# Patient Record
Sex: FEMALE | Race: BLACK OR AFRICAN AMERICAN | NOT HISPANIC OR LATINO | URBAN - METROPOLITAN AREA
[De-identification: names, ages, dates, MRNs, and addresses within clinical notes are randomized per-mention and may not be internally consistent; named-entity substitution may affect disease eponyms.]

---

## 2017-08-20 ENCOUNTER — EMERGENCY (EMERGENCY)
Facility: HOSPITAL | Age: 52
LOS: 1 days | Discharge: HOME | End: 2017-08-20
Attending: EMERGENCY MEDICINE | Admitting: EMERGENCY MEDICINE
Payer: MEDICAID

## 2017-08-20 VITALS
DIASTOLIC BLOOD PRESSURE: 61 MMHG | TEMPERATURE: 98 F | SYSTOLIC BLOOD PRESSURE: 129 MMHG | OXYGEN SATURATION: 100 % | RESPIRATION RATE: 16 BRPM | HEART RATE: 98 BPM

## 2017-08-20 VITALS
SYSTOLIC BLOOD PRESSURE: 159 MMHG | HEART RATE: 90 BPM | DIASTOLIC BLOOD PRESSURE: 80 MMHG | OXYGEN SATURATION: 99 % | RESPIRATION RATE: 16 BRPM

## 2017-08-20 LAB
ALBUMIN SERPL ELPH-MCNC: 4.7 G/DL — SIGNIFICANT CHANGE UP (ref 3.3–5)
ALP SERPL-CCNC: 103 U/L — SIGNIFICANT CHANGE UP (ref 40–120)
ALT FLD-CCNC: 84 U/L — HIGH (ref 4–33)
AST SERPL-CCNC: 71 U/L — HIGH (ref 4–32)
BASOPHILS # BLD AUTO: 0.04 K/UL — SIGNIFICANT CHANGE UP (ref 0–0.2)
BASOPHILS NFR BLD AUTO: 0.7 % — SIGNIFICANT CHANGE UP (ref 0–2)
BILIRUB SERPL-MCNC: 2.4 MG/DL — HIGH (ref 0.2–1.2)
BUN SERPL-MCNC: 8 MG/DL — SIGNIFICANT CHANGE UP (ref 7–23)
CALCIUM SERPL-MCNC: 9.5 MG/DL — SIGNIFICANT CHANGE UP (ref 8.4–10.5)
CHLORIDE SERPL-SCNC: 100 MMOL/L — SIGNIFICANT CHANGE UP (ref 98–107)
CO2 SERPL-SCNC: 22 MMOL/L — SIGNIFICANT CHANGE UP (ref 22–31)
CREAT SERPL-MCNC: 0.81 MG/DL — SIGNIFICANT CHANGE UP (ref 0.5–1.3)
EOSINOPHIL # BLD AUTO: 0.13 K/UL — SIGNIFICANT CHANGE UP (ref 0–0.5)
EOSINOPHIL NFR BLD AUTO: 2.1 % — SIGNIFICANT CHANGE UP (ref 0–6)
GLUCOSE SERPL-MCNC: 166 MG/DL — HIGH (ref 70–99)
HCT VFR BLD CALC: 34.6 % — SIGNIFICANT CHANGE UP (ref 34.5–45)
HGB BLD-MCNC: 11.7 G/DL — SIGNIFICANT CHANGE UP (ref 11.5–15.5)
IMM GRANULOCYTES # BLD AUTO: 0.06 # — SIGNIFICANT CHANGE UP
IMM GRANULOCYTES NFR BLD AUTO: 1 % — SIGNIFICANT CHANGE UP (ref 0–1.5)
LYMPHOCYTES # BLD AUTO: 1.57 K/UL — SIGNIFICANT CHANGE UP (ref 1–3.3)
LYMPHOCYTES # BLD AUTO: 25.7 % — SIGNIFICANT CHANGE UP (ref 13–44)
MCHC RBC-ENTMCNC: 33.8 % — SIGNIFICANT CHANGE UP (ref 32–36)
MCHC RBC-ENTMCNC: 33.8 PG — SIGNIFICANT CHANGE UP (ref 27–34)
MCV RBC AUTO: 100 FL — SIGNIFICANT CHANGE UP (ref 80–100)
MONOCYTES # BLD AUTO: 0.31 K/UL — SIGNIFICANT CHANGE UP (ref 0–0.9)
MONOCYTES NFR BLD AUTO: 5.1 % — SIGNIFICANT CHANGE UP (ref 2–14)
NEUTROPHILS # BLD AUTO: 3.99 K/UL — SIGNIFICANT CHANGE UP (ref 1.8–7.4)
NEUTROPHILS NFR BLD AUTO: 65.4 % — SIGNIFICANT CHANGE UP (ref 43–77)
NRBC # FLD: 0 — SIGNIFICANT CHANGE UP
PLATELET # BLD AUTO: 109 K/UL — LOW (ref 150–400)
PMV BLD: 9.7 FL — SIGNIFICANT CHANGE UP (ref 7–13)
POTASSIUM SERPL-MCNC: 3.7 MMOL/L — SIGNIFICANT CHANGE UP (ref 3.5–5.3)
POTASSIUM SERPL-SCNC: 3.7 MMOL/L — SIGNIFICANT CHANGE UP (ref 3.5–5.3)
PROT SERPL-MCNC: 6.8 G/DL — SIGNIFICANT CHANGE UP (ref 6–8.3)
RBC # BLD: 3.46 M/UL — LOW (ref 3.8–5.2)
RBC # FLD: 17.9 % — HIGH (ref 10.3–14.5)
SODIUM SERPL-SCNC: 142 MMOL/L — SIGNIFICANT CHANGE UP (ref 135–145)
TSH SERPL-MCNC: 1.42 UIU/ML — SIGNIFICANT CHANGE UP (ref 0.27–4.2)
WBC # BLD: 6.1 K/UL — SIGNIFICANT CHANGE UP (ref 3.8–10.5)
WBC # FLD AUTO: 6.1 K/UL — SIGNIFICANT CHANGE UP (ref 3.8–10.5)

## 2017-08-20 PROCEDURE — 99284 EMERGENCY DEPT VISIT MOD MDM: CPT | Mod: 25

## 2017-08-20 PROCEDURE — 76705 ECHO EXAM OF ABDOMEN: CPT | Mod: 26

## 2017-08-20 PROCEDURE — 70360 X-RAY EXAM OF NECK: CPT | Mod: 26

## 2017-08-20 RX ORDER — METOCLOPRAMIDE HCL 10 MG
10 TABLET ORAL ONCE
Qty: 0 | Refills: 0 | Status: COMPLETED | OUTPATIENT
Start: 2017-08-20 | End: 2017-08-20

## 2017-08-20 RX ORDER — POLYETHYLENE GLYCOL 3350 17 G/17G
17 POWDER, FOR SOLUTION ORAL ONCE
Qty: 0 | Refills: 0 | Status: COMPLETED | OUTPATIENT
Start: 2017-08-20 | End: 2017-08-20

## 2017-08-20 RX ORDER — ONDANSETRON 8 MG/1
4 TABLET, FILM COATED ORAL ONCE
Qty: 0 | Refills: 0 | Status: COMPLETED | OUTPATIENT
Start: 2017-08-20 | End: 2017-08-20

## 2017-08-20 RX ORDER — POLYETHYLENE GLYCOL 3350 17 G/17G
17 POWDER, FOR SOLUTION ORAL
Qty: 1 | Refills: 0 | OUTPATIENT
Start: 2017-08-20 | End: 2017-08-27

## 2017-08-20 RX ADMIN — ONDANSETRON 4 MILLIGRAM(S): 8 TABLET, FILM COATED ORAL at 09:53

## 2017-08-20 RX ADMIN — POLYETHYLENE GLYCOL 3350 17 GRAM(S): 17 POWDER, FOR SOLUTION ORAL at 08:54

## 2017-08-20 RX ADMIN — Medication 10 MILLIGRAM(S): at 12:48

## 2017-08-20 NOTE — ED PROVIDER NOTE - OBJECTIVE STATEMENT
51F with no PMHx presents with 6mo constipation and vomiting. Pt does not have medical follow up, is afraid of medical procedures and has not gone to a PMD. She usually goes to hospitals for constipation. Pt. is transplant from [place], has been having adjustment issues in [place] in light of recent political events. She has been having 6mo vomiting, but described as "spitting up" saliva associated with nausea. Has been having 4-5 episodes per week. Pt. says she suspects its due to her poor dentition.   No other symptoms. Denies f/c/abdominal/bloating/urinary symptoms. Not associated with food. Last BM was 2 days ago however her BMs have been small. Her most recent "full" BM was 2 weeks ago. 51F with no PMHx presents with 6mo constipation and vomiting. Pt does not have medical follow up, is afraid of medical procedures and has not gone to a PMD. She usually goes to hospitals for constipation. Pt. is transplant from [place], has been having adjustment issues in [place] in light of recent political events. She has been having 6mo vomiting, but described as "spitting up" saliva associated with nausea. Has been having 4-5 episodes per week. Pt. says she suspects its due to her poor dentition.   No other symptoms. Denies f/c/abdominal/bloating/urinary symptoms. Not associated with food. Last BM was 2 days ago however her BMs have been small. Her most recent "full" BM was 2 weeks ago.    Attending/Milagros: 52 yo F as described above, p/w ~4-5 months of constipation. She reports at baseline normally has a BM ~ every 2 days and now is ~4-5 days; +n/v. Denies abd distention, weight change. Pt also reports +depression, denies suicidal/homicidal ideation. 51F with no PMHx presents with 6mo constipation and vomiting. Pt does not have medical follow up, is afraid of medical procedures and has not gone to a PMD. She usually goes to hospitals for constipation. Pt. is transplant from [place], has been having adjustment issues in [place] in light of recent political events. She has been having 6mo vomiting, but described as "spitting up" saliva associated with nausea. Has been having 4-5 episodes per week. Pt. says she suspects its due to her poor dentition.   No other symptoms. Denies f/c/abdominal/bloating/urinary symptoms. Not associated with food. Last BM was 2 days ago however her BMs have been small. Her most recent "full" BM was 2 weeks ago. Has been drinking alcohol occasional to help with sleep. Denies withdrawal symptoms.    Attending/Milagros: 50 yo F as described above, p/w ~4-5 months of constipation. She reports at baseline normally has a BM ~ every 2 days and now is ~4-5 days; +n/v. Denies abd distention, weight change. Pt also reports +depression, denies suicidal/homicidal ideation.

## 2017-08-20 NOTE — ED PROVIDER NOTE - PHYSICAL EXAMINATION
Attending/Milagros: Well-appearing, NAD; PERRL/EOMI, non-icterus, O/P-post pharynx mild erythema, poor dentition, supple, no BHARAT, no JVD, RRR, CTAB; Abd-soft, NT/ND, no HSM; no LE edema, A&Ox3, nonfocal; Skin-warm/dry

## 2017-08-20 NOTE — ED ADULT NURSE REASSESSMENT NOTE - NS ED NURSE REASSESS COMMENT FT1
pt received from night staff, VIOLETTA pt received from night staff, AxOx4, ambulatory. Pt still has complaint of N&V. Pt has 1 episode of clear liquid vomit in the exam room at this time. Denies any abd pain/discomfort at this time. VS stable as noted. Will reassess and continue to monitor.

## 2017-08-20 NOTE — ED ADULT TRIAGE NOTE - CHIEF COMPLAINT QUOTE
pt c/o constipation x 5 months. pt states does not like hospitals so did not check up on it. pt states periods of vomiting as well but no blood in the vomit. pt states this happened before in the past and treated with enemas and states last BM last Friday but forced out. pt c/o constipation x 5 months. pt states does not like hospitals so did not check up on it. pt states periods of vomiting as well but no blood in the vomit. pt states this happened before in the past and treated with enemas and states last BM last Friday but forced out.    addend: pt vomiting in triage

## 2017-08-20 NOTE — ED ADULT NURSE NOTE - CHPI ED SYMPTOMS NEG
no chills/no blood in stool/no dysuria/no hematuria/no nausea/no diarrhea/no burning urination/no abdominal distension/no fever

## 2017-08-20 NOTE — ED ADULT NURSE NOTE - CHIEF COMPLAINT QUOTE
pt c/o constipation x 5 months. pt states does not like hospitals so did not check up on it. pt states periods of vomiting as well but no blood in the vomit. pt states this happened before in the past and treated with enemas and states last BM last Friday but forced out.    addend: pt vomiting in triage

## 2017-08-20 NOTE — ED ADULT NURSE REASSESSMENT NOTE - NS ED NURSE REASSESS COMMENT FT1
RN Break Coverage: Alert and oriented x 4. Pt received from RN Lorraine in no distress. VSS. Will continue to monitor.

## 2017-08-20 NOTE — ED ADULT NURSE NOTE - OBJECTIVE STATEMENT
Pt 51y female presents to the ED c/o vomiting and constipation x months. Pt states vomiting varies daily and she feels "pressure" when trying to use the restroom. No other physical complaints. NAD observed. Pt aaox4 and ambulatory. Pt appears comfortable and in no pain at this time. Stool normal color, vomiting clear fluids. Pt not actively vomiting, denies cp, sob, abd pain, no distention, fever, chills, ha. Pt denies pmh. VSS, will continue to monitor.

## 2017-08-21 LAB
HAV IGM SER-ACNC: NONREACTIVE — SIGNIFICANT CHANGE UP
HBV CORE IGM SER-ACNC: NONREACTIVE — SIGNIFICANT CHANGE UP
HBV SURFACE AG SER-ACNC: NONREACTIVE — SIGNIFICANT CHANGE UP
HCV AB S/CO SERPL IA: 0.08 S/CO — SIGNIFICANT CHANGE UP
HCV AB SERPL-IMP: SIGNIFICANT CHANGE UP

## 2017-08-21 NOTE — ED POST DISCHARGE NOTE - REASON FOR FOLLOW-UP
Other Admin SAYDA English: Telephone follow up requested Dr. Patel. Pt was found to have transaminitis and hyperbilirubinemia. Follow up on hepatitis panel

## 2025-06-24 NOTE — ED ADULT NURSE NOTE - CARDIO WDL
----- Message from KIRA Lopez sent at 6/24/2025  1:31 PM CDT -----  Please call patient with results. BMP is abnormal, which shows decreased GFR (chronic, stable, consistent with mild CKD stage III, will monitor). Prior elevated calcium and potassium resolved.   Normal rate, regular rhythm, normal S1, S2 heart sounds heard.